# Patient Record
Sex: MALE | Race: WHITE | NOT HISPANIC OR LATINO | URBAN - METROPOLITAN AREA
[De-identification: names, ages, dates, MRNs, and addresses within clinical notes are randomized per-mention and may not be internally consistent; named-entity substitution may affect disease eponyms.]

---

## 2017-04-04 ENCOUNTER — EMERGENCY (EMERGENCY)
Facility: HOSPITAL | Age: 22
LOS: 1 days | Discharge: HOME | End: 2017-04-04
Attending: EMERGENCY MEDICINE | Admitting: EMERGENCY MEDICINE
Payer: COMMERCIAL

## 2017-04-04 VITALS
OXYGEN SATURATION: 100 % | SYSTOLIC BLOOD PRESSURE: 146 MMHG | DIASTOLIC BLOOD PRESSURE: 91 MMHG | HEART RATE: 85 BPM | HEIGHT: 68 IN | TEMPERATURE: 98 F | RESPIRATION RATE: 20 BRPM

## 2017-04-04 DIAGNOSIS — H92.03 OTALGIA, BILATERAL: ICD-10-CM

## 2017-04-04 DIAGNOSIS — J06.9 ACUTE UPPER RESPIRATORY INFECTION, UNSPECIFIED: ICD-10-CM

## 2017-04-04 DIAGNOSIS — R51 HEADACHE: ICD-10-CM

## 2017-04-04 DIAGNOSIS — Z79.899 OTHER LONG TERM (CURRENT) DRUG THERAPY: ICD-10-CM

## 2017-04-04 DIAGNOSIS — Z86.59 PERSONAL HISTORY OF OTHER MENTAL AND BEHAVIORAL DISORDERS: ICD-10-CM

## 2017-04-04 PROCEDURE — 99283 EMERGENCY DEPT VISIT LOW MDM: CPT

## 2017-04-04 PROCEDURE — 99283 EMERGENCY DEPT VISIT LOW MDM: CPT | Mod: 25

## 2017-04-04 RX ORDER — LISDEXAMFETAMINE DIMESYLATE 70 MG/1
0 CAPSULE ORAL
Qty: 0 | Refills: 0 | COMMUNITY

## 2017-04-04 RX ORDER — NEOMYCIN/POLYMYXIN B/DEXAMETHA 0.1 %
0 SUSPENSION, DROPS(FINAL DOSAGE FORM)(ML) OPHTHALMIC (EYE)
Qty: 0 | Refills: 0 | COMMUNITY

## 2017-04-04 NOTE — ED ADULT TRIAGE NOTE - CHIEF COMPLAINT QUOTE
left ear pain.  Pt went to urgent care today for right ear and prescribed antibiotics.  Now having left ear pain.  having left ear ringing and hearing loss

## 2017-04-04 NOTE — ED ADULT NURSE NOTE - OBJECTIVE STATEMENT
pt c/o "worsening left ear pain. went to urgent care this morning and was prescribed z pack and took one dose. pain did not resolve after taking 200mg of ibuprofen."

## 2017-04-05 RX ORDER — IBUPROFEN 200 MG
400 TABLET ORAL ONCE
Qty: 0 | Refills: 0 | Status: COMPLETED | OUTPATIENT
Start: 2017-04-05 | End: 2017-04-05

## 2017-04-05 RX ORDER — ACETAMINOPHEN 500 MG
650 TABLET ORAL ONCE
Qty: 0 | Refills: 0 | Status: COMPLETED | OUTPATIENT
Start: 2017-04-05 | End: 2017-04-05

## 2017-04-05 NOTE — ED PROVIDER NOTE - PLAN OF CARE
Stay well-hydrated.  Complete course of antibiotics as prescribed.  Use Motrin 600mg and/or Tylenol 650mg every 6 hours as needed for pain.  Use saline 1-2 sprays in each nostril every 4-6 hours.  Follow-up with ENT for recurrent sinusitis.  Return for any new/worsening symptoms or concerns.

## 2017-04-05 NOTE — ED PROVIDER NOTE - ENMT, MLM
Airway patent, Nasal mucosa clear. Mouth with normal mucosa. Throat has no vesicles, no oropharyngeal exudates and uvula is midline.  L sided post-tympanic effusion, decreased light reflex.  R TM erythema, dulled light reflex

## 2017-04-05 NOTE — ED PROVIDER NOTE - MEDICAL DECISION MAKING DETAILS
21M healthy presents with otalgia, headache, sinus pressure and cough.  VSS, well-appearing.  history and physical suggestive on sinusitis/otitis media likely viral however given L-sided effusion will recommend completing ocu 21M healthy presents with otalgia, headache, sinus pressure and cough.  VSS, well-appearing.  history and physical suggestive on sinusitis/otitis media likely viral however given L-sided effusion will recommend completing course of abx.

## 2017-04-05 NOTE — ED PROVIDER NOTE - OBJECTIVE STATEMENT
21M hx ADD presents with L ear pain.  Associated with cough productive of sputum, mild sore throat.  Patient was evaluated by Urgent care this morning for sinus pressure, headache and R ear pain, dx with a sinusitis and prescribed Z-Jeremiah s/p 1 dose.  Patient then developed worsening L ear pain.  Took 200mg ibuprofen this evening with minimal relief.

## 2017-04-05 NOTE — ED PROVIDER NOTE - CARE PLAN
Instructions for follow-up, activity and diet:	Stay well-hydrated.  Complete course of antibiotics as prescribed.  Use Motrin 600mg and/or Tylenol 650mg every 6 hours as needed for pain.  Use saline 1-2 sprays in each nostril every 4-6 hours.  Follow-up with ENT for recurrent sinusitis.  Return for any new/worsening symptoms or concerns. Principal Discharge DX:	Upper respiratory infection, acute  Instructions for follow-up, activity and diet:	Stay well-hydrated.  Complete course of antibiotics as prescribed.  Use Motrin 600mg and/or Tylenol 650mg every 6 hours as needed for pain.  Use saline 1-2 sprays in each nostril every 4-6 hours.  Follow-up with ENT for recurrent sinusitis.  Return for any new/worsening symptoms or concerns.

## 2017-04-05 NOTE — ED PROVIDER NOTE - ATTENDING CONTRIBUTION TO CARE
22 yo male with b/l ear pain, cough, seen by urgent care for same and got rx for Azithromycin.  No signs of otitis externa on exam, R > L effusion; suspect viral source though will let patient finish course of azithromycin.  Motrin for symptom relief, ENT follow-up as outpatient.  Very well appearing.